# Patient Record
Sex: FEMALE | Race: WHITE | NOT HISPANIC OR LATINO | URBAN - METROPOLITAN AREA
[De-identification: names, ages, dates, MRNs, and addresses within clinical notes are randomized per-mention and may not be internally consistent; named-entity substitution may affect disease eponyms.]

---

## 2023-04-13 SDOH — ECONOMIC STABILITY: TRANSPORTATION INSECURITY
IN THE PAST 12 MONTHS, HAS THE LACK OF TRANSPORTATION KEPT YOU FROM MEDICAL APPOINTMENTS OR FROM GETTING MEDICATIONS?: PATIENT DECLINED

## 2023-04-13 SDOH — ECONOMIC STABILITY: INCOME INSECURITY: HOW HARD IS IT FOR YOU TO PAY FOR THE VERY BASICS LIKE FOOD, HOUSING, MEDICAL CARE, AND HEATING?: NOT VERY HARD

## 2023-04-13 SDOH — ECONOMIC STABILITY: HOUSING INSECURITY
IN THE LAST 12 MONTHS, WAS THERE A TIME WHEN YOU DID NOT HAVE A STEADY PLACE TO SLEEP OR SLEPT IN A SHELTER (INCLUDING NOW)?: NO

## 2023-04-13 SDOH — ECONOMIC STABILITY: INCOME INSECURITY: IN THE LAST 12 MONTHS, WAS THERE A TIME WHEN YOU WERE NOT ABLE TO PAY THE MORTGAGE OR RENT ON TIME?: NO

## 2023-04-13 SDOH — HEALTH STABILITY: PHYSICAL HEALTH: ON AVERAGE, HOW MANY MINUTES DO YOU ENGAGE IN EXERCISE AT THIS LEVEL?: 60 MIN

## 2023-04-13 SDOH — ECONOMIC STABILITY: FOOD INSECURITY: WITHIN THE PAST 12 MONTHS, YOU WORRIED THAT YOUR FOOD WOULD RUN OUT BEFORE YOU GOT MONEY TO BUY MORE.: PATIENT DECLINED

## 2023-04-13 SDOH — HEALTH STABILITY: PHYSICAL HEALTH: ON AVERAGE, HOW MANY DAYS PER WEEK DO YOU ENGAGE IN MODERATE TO STRENUOUS EXERCISE (LIKE A BRISK WALK)?: 5 DAYS

## 2023-04-13 SDOH — ECONOMIC STABILITY: TRANSPORTATION INSECURITY
IN THE PAST 12 MONTHS, HAS LACK OF TRANSPORTATION KEPT YOU FROM MEETINGS, WORK, OR FROM GETTING THINGS NEEDED FOR DAILY LIVING?: PATIENT DECLINED

## 2023-04-13 SDOH — ECONOMIC STABILITY: FOOD INSECURITY: WITHIN THE PAST 12 MONTHS, THE FOOD YOU BOUGHT JUST DIDN'T LAST AND YOU DIDN'T HAVE MONEY TO GET MORE.: PATIENT DECLINED

## 2023-04-13 ASSESSMENT — SOCIAL DETERMINANTS OF HEALTH (SDOH)
IN A TYPICAL WEEK, HOW MANY TIMES DO YOU TALK ON THE PHONE WITH FAMILY, FRIENDS, OR NEIGHBORS?: THREE TIMES A WEEK
ARE YOU MARRIED, WIDOWED, DIVORCED, SEPARATED, NEVER MARRIED, OR LIVING WITH A PARTNER?: LIVING WITH PARTNER
HOW OFTEN DO YOU ATTENT MEETINGS OF THE CLUB OR ORGANIZATION YOU BELONG TO?: NEVER
HOW OFTEN DO YOU GET TOGETHER WITH FRIENDS OR RELATIVES?: ONCE A WEEK
HOW OFTEN DO YOU ATTEND CHURCH OR RELIGIOUS SERVICES?: NEVER
DO YOU BELONG TO ANY CLUBS OR ORGANIZATIONS SUCH AS CHURCH GROUPS UNIONS, FRATERNAL OR ATHLETIC GROUPS, OR SCHOOL GROUPS?: NO

## 2023-04-13 ASSESSMENT — LIFESTYLE VARIABLES
SKIP TO QUESTIONS 9-10: 1
HOW OFTEN DO YOU HAVE A DRINK CONTAINING ALCOHOL: 2-3 TIMES A WEEK
HOW OFTEN DO YOU HAVE SIX OR MORE DRINKS ON ONE OCCASION: NEVER
AUDIT-C TOTAL SCORE: 3
HOW MANY STANDARD DRINKS CONTAINING ALCOHOL DO YOU HAVE ON A TYPICAL DAY: 1 OR 2

## 2023-04-17 SDOH — HEALTH STABILITY: PHYSICAL HEALTH: ON AVERAGE, HOW MANY MINUTES DO YOU ENGAGE IN EXERCISE AT THIS LEVEL?: 60 MIN

## 2023-04-17 SDOH — ECONOMIC STABILITY: HOUSING INSECURITY

## 2023-04-17 SDOH — ECONOMIC STABILITY: TRANSPORTATION INSECURITY
IN THE PAST 12 MONTHS, HAS LACK OF RELIABLE TRANSPORTATION KEPT YOU FROM MEDICAL APPOINTMENTS, MEETINGS, WORK OR FROM GETTING THINGS NEEDED FOR DAILY LIVING?: PATIENT DECLINED

## 2023-04-17 SDOH — HEALTH STABILITY: PHYSICAL HEALTH: ON AVERAGE, HOW MANY DAYS PER WEEK DO YOU ENGAGE IN MODERATE TO STRENUOUS EXERCISE (LIKE A BRISK WALK)?: 5 DAYS

## 2023-04-17 SDOH — ECONOMIC STABILITY: INCOME INSECURITY: IN THE LAST 12 MONTHS, WAS THERE A TIME WHEN YOU WERE NOT ABLE TO PAY THE MORTGAGE OR RENT ON TIME?: NO

## 2023-04-17 SDOH — ECONOMIC STABILITY: FOOD INSECURITY: WITHIN THE PAST 12 MONTHS, YOU WORRIED THAT YOUR FOOD WOULD RUN OUT BEFORE YOU GOT MONEY TO BUY MORE.: PATIENT DECLINED

## 2023-04-17 SDOH — ECONOMIC STABILITY: INCOME INSECURITY: HOW HARD IS IT FOR YOU TO PAY FOR THE VERY BASICS LIKE FOOD, HOUSING, MEDICAL CARE, AND HEATING?: NOT VERY HARD

## 2023-04-17 SDOH — ECONOMIC STABILITY: FOOD INSECURITY: WITHIN THE PAST 12 MONTHS, THE FOOD YOU BOUGHT JUST DIDN'T LAST AND YOU DIDN'T HAVE MONEY TO GET MORE.: PATIENT DECLINED

## 2023-04-17 SDOH — HEALTH STABILITY: MENTAL HEALTH
STRESS IS WHEN SOMEONE FEELS TENSE, NERVOUS, ANXIOUS, OR CAN'T SLEEP AT NIGHT BECAUSE THEIR MIND IS TROUBLED. HOW STRESSED ARE YOU?: ONLY A LITTLE

## 2023-04-17 ASSESSMENT — SOCIAL DETERMINANTS OF HEALTH (SDOH)
ARE YOU MARRIED, WIDOWED, DIVORCED, SEPARATED, NEVER MARRIED, OR LIVING WITH A PARTNER?: LIVING WITH PARTNER
IN A TYPICAL WEEK, HOW MANY TIMES DO YOU TALK ON THE PHONE WITH FAMILY, FRIENDS, OR NEIGHBORS?: THREE TIMES A WEEK
HOW OFTEN DO YOU ATTENT MEETINGS OF THE CLUB OR ORGANIZATION YOU BELONG TO?: NEVER
HOW OFTEN DO YOU GET TOGETHER WITH FRIENDS OR RELATIVES?: ONCE A WEEK
WITHIN THE PAST 12 MONTHS, YOU WORRIED THAT YOUR FOOD WOULD RUN OUT BEFORE YOU GOT THE MONEY TO BUY MORE: PATIENT DECLINED
HOW OFTEN DO YOU ATTEND CHURCH OR RELIGIOUS SERVICES?: NEVER
HOW OFTEN DO YOU ATTENT MEETINGS OF THE CLUB OR ORGANIZATION YOU BELONG TO?: NEVER
HOW OFTEN DO YOU GET TOGETHER WITH FRIENDS OR RELATIVES?: ONCE A WEEK
HOW HARD IS IT FOR YOU TO PAY FOR THE VERY BASICS LIKE FOOD, HOUSING, MEDICAL CARE, AND HEATING?: NOT VERY HARD
HOW OFTEN DO YOU ATTEND CHURCH OR RELIGIOUS SERVICES?: NEVER
ARE YOU MARRIED, WIDOWED, DIVORCED, SEPARATED, NEVER MARRIED, OR LIVING WITH A PARTNER?: LIVING WITH PARTNER
IN A TYPICAL WEEK, HOW MANY TIMES DO YOU TALK ON THE PHONE WITH FAMILY, FRIENDS, OR NEIGHBORS?: THREE TIMES A WEEK
DO YOU BELONG TO ANY CLUBS OR ORGANIZATIONS SUCH AS CHURCH GROUPS UNIONS, FRATERNAL OR ATHLETIC GROUPS, OR SCHOOL GROUPS?: NO
HOW OFTEN DO YOU HAVE SIX OR MORE DRINKS ON ONE OCCASION: NEVER
HOW MANY DRINKS CONTAINING ALCOHOL DO YOU HAVE ON A TYPICAL DAY WHEN YOU ARE DRINKING: 1 OR 2
HOW OFTEN DO YOU HAVE A DRINK CONTAINING ALCOHOL: 2-3 TIMES A WEEK
DO YOU BELONG TO ANY CLUBS OR ORGANIZATIONS SUCH AS CHURCH GROUPS UNIONS, FRATERNAL OR ATHLETIC GROUPS, OR SCHOOL GROUPS?: NO

## 2023-04-17 ASSESSMENT — LIFESTYLE VARIABLES
SKIP TO QUESTIONS 9-10: 1
AUDIT-C TOTAL SCORE: 3
HOW MANY STANDARD DRINKS CONTAINING ALCOHOL DO YOU HAVE ON A TYPICAL DAY: 1 OR 2
HOW OFTEN DO YOU HAVE A DRINK CONTAINING ALCOHOL: 2-3 TIMES A WEEK
HOW OFTEN DO YOU HAVE SIX OR MORE DRINKS ON ONE OCCASION: NEVER

## 2023-04-18 ENCOUNTER — OFFICE VISIT (OUTPATIENT)
Dept: MEDICAL GROUP | Facility: LAB | Age: 23
End: 2023-04-18
Payer: COMMERCIAL

## 2023-04-18 VITALS
RESPIRATION RATE: 12 BRPM | DIASTOLIC BLOOD PRESSURE: 76 MMHG | OXYGEN SATURATION: 96 % | HEIGHT: 63 IN | BODY MASS INDEX: 25.34 KG/M2 | SYSTOLIC BLOOD PRESSURE: 114 MMHG | WEIGHT: 143 LBS | HEART RATE: 74 BPM | TEMPERATURE: 97.4 F

## 2023-04-18 DIAGNOSIS — Z23 NEED FOR VACCINATION: ICD-10-CM

## 2023-04-18 DIAGNOSIS — N20.0 KIDNEY STONE: ICD-10-CM

## 2023-04-18 DIAGNOSIS — R19.7 FREQUENT DIARRHEA: ICD-10-CM

## 2023-04-18 DIAGNOSIS — Z13.29 SCREENING FOR THYROID DISORDER: ICD-10-CM

## 2023-04-18 DIAGNOSIS — R05.3 CHRONIC COUGH: ICD-10-CM

## 2023-04-18 DIAGNOSIS — H93.11 TINNITUS OF RIGHT EAR: ICD-10-CM

## 2023-04-18 DIAGNOSIS — Z76.89 ENCOUNTER TO ESTABLISH CARE: ICD-10-CM

## 2023-04-18 DIAGNOSIS — Z11.3 ROUTINE SCREENING FOR STI (SEXUALLY TRANSMITTED INFECTION): ICD-10-CM

## 2023-04-18 DIAGNOSIS — Z13.220 LIPID SCREENING: ICD-10-CM

## 2023-04-18 PROBLEM — R56.9 SEIZURE (HCC): Status: ACTIVE | Noted: 2023-04-18

## 2023-04-18 PROCEDURE — 90651 9VHPV VACCINE 2/3 DOSE IM: CPT | Performed by: PHYSICIAN ASSISTANT

## 2023-04-18 PROCEDURE — 90471 IMMUNIZATION ADMIN: CPT | Performed by: PHYSICIAN ASSISTANT

## 2023-04-18 PROCEDURE — 90621 MENB-FHBP VACC 2/3 DOSE IM: CPT | Performed by: PHYSICIAN ASSISTANT

## 2023-04-18 PROCEDURE — 90472 IMMUNIZATION ADMIN EACH ADD: CPT | Performed by: PHYSICIAN ASSISTANT

## 2023-04-18 PROCEDURE — 99204 OFFICE O/P NEW MOD 45 MIN: CPT | Mod: 25 | Performed by: PHYSICIAN ASSISTANT

## 2023-04-18 RX ORDER — TRIAMCINOLONE ACETONIDE 1 MG/G
1 CREAM TOPICAL 2 TIMES DAILY
Qty: 45 G | Refills: 0 | Status: SHIPPED | OUTPATIENT
Start: 2023-04-18 | End: 2023-05-02

## 2023-04-18 RX ORDER — ALBUTEROL SULFATE 90 UG/1
2 AEROSOL, METERED RESPIRATORY (INHALATION) EVERY 4 HOURS PRN
Qty: 1 EACH | Refills: 1 | Status: SHIPPED | OUTPATIENT
Start: 2023-04-18

## 2023-04-18 NOTE — PROGRESS NOTES
"Subjective:     CC:  Diagnoses of Encounter to establish care, Kidney stone, Frequent diarrhea, Tinnitus of right ear, Chronic cough, Lipid screening, Routine screening for STI (sexually transmitted infection), Screening for thyroid disorder, and Need for vaccination were pertinent to this visit.    HISTORY OF THE PRESENT ILLNESS: Patient is a 23 y.o. female. This pleasant patient is here today to establish care and discuss referrals. His/her prior PCP was none.    Hx of recurrent kidney stones  -pt believes she passed a stone earlier this week. Prev ER eval 01/2023 did show 1mm bilateral non obstructing stones on CT scan. Pt passed 2mm stone at that time as well. Denies previous workup.    ?Asthma  -exposure to black mold x 2 years   -feels \"like I'm out of breath easily\"  -episodes of wheezing, cough  -coughing up phlegm - usually in the AM  -family hx of asthma, no prev treatment, no prev PFT    R ear ringing/itching and tinnitus sensation  -since childhood    Hemorrhoid  X 1.5 years, associated with frequent diarrhea. No improvement with dietary modification  -has tried OTC treatments  -small amount of blood  -no previous GI workup    Follows with therapist for depression and anxiety      Health Maintenance     - Dyslipidemia (30-45): orderd  - Diabetes (HTN, HLD, BMI >25): ordered  - Depression screening (PHQ-2 and/or PHQ-9): well controlled at this time  Diet: regular  Exercise: moderate-high, active job  Sleep: good  Substance Use: MJ usage  Tobacco Use/counseling: denies  Safe in relationship: yes  Seat belts, bike helmet, gun safety discussed.  Sun protection used.       Cancer screening  - Cervical CA (21-65): due  -  HX Abnormal pap/HPV: none     Infectious disease screening/Immunizations  --STI/HIV Screening: ordered  --Hepatitis C Screening (18 to 78 yo): ordered  --Immunizations: MenB, HPV given today      Current Outpatient Medications Ordered in Epic   Medication Sig Dispense Refill    albuterol 108 " "(90 Base) MCG/ACT Aero Soln inhalation aerosol Inhale 2 Puffs every four hours as needed for Shortness of Breath. 1 Each 1    triamcinolone acetonide (KENALOG) 0.1 % Cream Apply 1 Application. topically 2 times a day for 14 days. 45 g 0     No current Epic-ordered facility-administered medications on file.       Health Maintenance: Completed    ROS:   Gen: no fevers/chills, no changes in weight  Eyes: no changes in vision  ENT: no sore throat, no hearing loss, no bloody nose  Pulm: no sob, no cough  CV: no chest pain, no palpitations  GI: no nausea/vomiting, no diarrhea  : no dysuria  MSk: no myalgias  Skin: no rash  Neuro: no headaches, no numbness/tingling  Heme/Lymph: no easy bruising      Objective:       Exam: /76   Pulse 74   Temp 36.3 °C (97.4 °F)   Resp 12   Ht 1.6 m (5' 3\")   Wt 64.9 kg (143 lb)   SpO2 96%  Body mass index is 25.33 kg/m².    General: Normal appearing. No distress.  HEENT: Normocephalic. Eyes conjunctiva clear lids without ptosis, pupils equal and reactive to light accommodation, ears normal shape and contour, canals are clear bilaterally, tympanic membranes are benign, nasal mucosa benign, oropharynx is without erythema, edema or exudates.   Neck: Supple without JVD or bruit. Thyroid is not enlarged.  Pulmonary: Clear to ausculation.  Normal effort. No rales, ronchi, or wheezing.  Cardiovascular: Regular rate and rhythm without murmur. Carotid and radial pulses are intact and equal bilaterally.  Abdomen: Soft, nontender, nondistended. Normal bowel sounds. Liver and spleen are not palpable  Neurologic: Grossly nonfocal  Lymph: No cervical or supraclavicular lymph nodes are palpable  Skin: Warm and dry.  No obvious lesions.  Musculoskeletal: Normal gait. No extremity cyanosis, clubbing, or edema.  Psych: Normal mood and affect. Alert and oriented x3. Judgment and insight is normal.      Assessment & Plan:   23 y.o. female with the following -    1. Encounter to establish " care  Labs per orders  Vaccinations per orders  Pt is due for preventative screenings: pap  Screenings per orders      2. Kidney stone  Recurrent kidney stones  We will updated labs  Referral to urology for further eval  - URIC ACID; Future  - Referral to Urology    3. Frequent diarrhea  Continu  - Referral to Gastroenterology    4. Tinnitus of right ear  - Referral to ENT    5. Chronic cough  Chronic condition, new to me  Possibly secondary to chronic exposure to black mold. We will trial albuterol and get updated PFTs  - PULMONARY FUNCTION TESTS -Test requested: Complete Pulmonary Function Test; Future  - albuterol 108 (90 Base) MCG/ACT Aero Soln inhalation aerosol; Inhale 2 Puffs every four hours as needed for Shortness of Breath.  Dispense: 1 Each; Refill: 1    6. Lipid screening  - CBC WITH DIFFERENTIAL; Future  - Comp Metabolic Panel; Future  - Lipid Profile; Future    7. Routine screening for STI (sexually transmitted infection)  - Chlamydia/GC, PCR (Urine); Future  - HEP C VIRUS ANTIBODY; Future  - T.PALLIDUM AB TANYA (SCREENING); Future  - HIV AG/AB COMBO ASSAY SCREENING; Future    8. Screening for thyroid disorder  - TSH WITH REFLEX TO FT4; Future    9. Need for vaccination  - Gardasil 9  - Meningococcal (IM) Group B      I spent a total of 25 minutes with record review, exam, communication with the patient, communication with other providers, and documentation of this encounter.    Return in about 4 weeks (around 5/16/2023) for Pap.    Please note that this dictation was created using voice recognition software. I have made every reasonable attempt to correct obvious errors, but I expect that there are errors of grammar and possibly content that I did not discover before finalizing the note.

## 2023-04-25 ENCOUNTER — TELEPHONE (OUTPATIENT)
Dept: HEALTH INFORMATION MANAGEMENT | Facility: OTHER | Age: 23
End: 2023-04-25
Payer: COMMERCIAL

## 2023-04-27 ENCOUNTER — HOSPITAL ENCOUNTER (OUTPATIENT)
Dept: PULMONOLOGY | Facility: MEDICAL CENTER | Age: 23
End: 2023-04-27
Attending: PHYSICIAN ASSISTANT
Payer: COMMERCIAL

## 2023-04-27 DIAGNOSIS — R05.3 CHRONIC COUGH: ICD-10-CM

## 2023-04-27 PROCEDURE — 94060 EVALUATION OF WHEEZING: CPT | Mod: 26 | Performed by: INTERNAL MEDICINE

## 2023-04-27 PROCEDURE — 94729 DIFFUSING CAPACITY: CPT

## 2023-04-27 PROCEDURE — 94060 EVALUATION OF WHEEZING: CPT

## 2023-04-27 PROCEDURE — 94729 DIFFUSING CAPACITY: CPT | Mod: 26 | Performed by: INTERNAL MEDICINE

## 2023-04-27 PROCEDURE — 94726 PLETHYSMOGRAPHY LUNG VOLUMES: CPT

## 2023-04-27 PROCEDURE — 94726 PLETHYSMOGRAPHY LUNG VOLUMES: CPT | Mod: 26 | Performed by: INTERNAL MEDICINE

## 2023-04-27 RX ADMIN — Medication 2.5 MG: at 13:27

## 2023-04-27 ASSESSMENT — PULMONARY FUNCTION TESTS
FVC_PREDICTED: 3.68
FEV1_PERCENT_PREDICTED: 83
FEV1/FVC: 84.28
FEV1/FVC: 88
FEV1/FVC_PREDICTED: 88
FEV1: 2.68
FVC_LLN: 3.07
FVC: 3.18
FVC_LLN: 3.07
FEV1/FVC_PERCENT_CHANGE: -3
FEV1_PERCENT_CHANGE: -10
FEV1/FVC_PERCENT_CHANGE: 143
FEV1_PERCENT_PREDICTED: 94
FVC: 3.46
FEV1/FVC_PERCENT_PREDICTED: 99
FEV1: 3.03
FEV1/FVC: 87
FVC_PERCENT_PREDICTED: 94
FEV1/FVC_PERCENT_PREDICTED: 100
FEV1/FVC_PERCENT_LLN: 73
FEV1/FVC: 84
FEV1/FVC_PERCENT_PREDICTED: 97
FVC_PERCENT_PREDICTED: 86
FEV1/FVC_PERCENT_PREDICTED: 87
FEV1_PREDICTED: 3.21
FEV1_LLN: 2.68
FEV1/FVC_PERCENT_PREDICTED: 96
FEV1_LLN: 2.68
FEV1_PERCENT_CHANGE: -7
FEV1/FVC_PERCENT_LLN: 73

## 2023-04-28 NOTE — PROCEDURES
DATE OF SERVICE:  04/27/2023     PULMONARY FUNCTION TEST INTERPRETATION     Normal baseline spirometry which deteriorates postbronchodilator.  Flow-volume loops are normal.  Full lung volumes demonstrate air trapping as well as reduction in ERV.  The DLCO is elevated (greater than 120%).    Summary:  The DLCO is significantly elevated (> 120% predicted). This may be falsely elevated due to the patient potentially performing a Dykes maneuver during testing. Additionally, increased DLCO can be seen with asthma, polycythemia, left-to-right shunts, exercise. Correlate clinically and with other studies, and consider repeating DLCO testing if more clarification is needed.        ______________________________  DO FACNUDO KYE/MY/MORE    DD:  04/27/2023 18:24  DT:  04/27/2023 19:32    Job#:  [id number]